# Patient Record
Sex: FEMALE | Race: OTHER | Employment: UNEMPLOYED | URBAN - METROPOLITAN AREA
[De-identification: names, ages, dates, MRNs, and addresses within clinical notes are randomized per-mention and may not be internally consistent; named-entity substitution may affect disease eponyms.]

---

## 2022-03-22 ENCOUNTER — OFFICE VISIT (OUTPATIENT)
Dept: ORTHOPEDIC SURGERY | Age: 22
End: 2022-03-22
Payer: COMMERCIAL

## 2022-03-22 VITALS
HEIGHT: 61 IN | OXYGEN SATURATION: 98 % | WEIGHT: 97.2 LBS | HEART RATE: 84 BPM | BODY MASS INDEX: 18.35 KG/M2 | TEMPERATURE: 98.5 F

## 2022-03-22 DIAGNOSIS — M54.9 UPPER BACK PAIN: ICD-10-CM

## 2022-03-22 DIAGNOSIS — S16.1XXA REPETITIVE STRAIN INJURY OF CERVICAL SPINE, INITIAL ENCOUNTER: Primary | ICD-10-CM
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
## 2022-03-22 DIAGNOSIS — M41.9 MILD SCOLIOSIS: ICD-10-CM

## 2022-03-22 PROCEDURE — 99203 OFFICE O/P NEW LOW 30 MIN: CPT | Performed by: PHYSICAL MEDICINE & REHABILITATION

## 2022-03-22 NOTE — PROGRESS NOTES
[address]  [address], [address]  [facility], [address]  Phone: [phone]  Fax: [phone]      Patient: Gi Farley                                                                              MRN: [id number]        YOB: 2000          AGE: 22 y.o. PCP: Vicenta Rosa NP  Date:  03/22/22    Reason for Consultation: Back Pain      HPI:  Gi Farley is a 22 y.o. female with relevant PMH of mild scoliosis who presents with upper back pain. The pain began several years ago but worsened after her recent pregnancy and delivery 2/2021. She was told several years ago she had scoliosis and did have recent x-rays which demonstrate a mild upper thoracic curvature in her spine and mild lumbar curvature . Neurologic symptoms: + numbness, tingling No  weakness, bowel or bladder changes. No recent falls      Location: The pain is located in the upper back b/l shoulders   Radiation: The pain does radiate not radiate. Pain Score: Currently: 10/10  Quality: Pain is of a Burning, Cramping, Stabbing, Stiff, Tingling and Numbness quality. Aggravating: Pain is exacerbated by sitting and standing, at night   Alleviating: The pain is alleviated by nothing    Prior Treatments:  Physical therapy: NO  Injections:NO    Previous Medications:   Current Medications:  Previous work-up has included:   X-ray spine 2/14/2022  mild to moderate right mid to upper thoracic scoliosis with very mild compensatory left mid to lower lumbar scoliosis. Scoliotic curvature is estimated to measure approximately 12.5 degrees as measured from superior endplate of T4 down through inferior endplate of T9. No osseous congenital abnormalities are otherwise identified. Past Medical History:   Past Medical History:   Diagnosis Date    Scoliosis       Past Surgical History: History reviewed. No pertinent surgical history.    SocHx:   Social History Tobacco Use    Smoking status: Never Smoker    Smokeless tobacco: Never Used   Substance Use Topics    Alcohol use: Not on file      FamHx:? No family history on file. Current Medications: Allergies:  No Known Allergies     Review of Systems:   Gen:    Denied fevers, chills, malaise, fatigue, weight changes   Resp: Denied shortness of breath, cough, wheezing   CVS: Denied chest pain, palpitations   : Denied urinary urgency, frequency, incontinence   GI: Denied nausea, vomiting, constipation, diarrhea   Skin: Denied rashes, wounds   Psych: Denied anxiety, depression   Vasc: Denied claudication, ulcers   Hem: Denied easy bruising/bleeding   MSK: See HPI   Neuro: See HPI         Physical Exam     Vital Signs:   Visit Vitals  Pulse 84   Temp 98.5 °F (36.9 °C) (Temporal)   Ht 5' 1\" (1.549 m)   Wt 97 lb 3.2 oz (44.1 kg)   LMP 03/02/2022   SpO2 98%   BMI 18.37 kg/m²      General: ??????? Well nourished and well developed female without any acute distress   Psychiatric: ?  Alert and oriented x 3 with normal mood    HEENT: ???????? Atraumatic   Respiratory:   Breathing non-labored and non dyspneic   CV: ???????????????? Peripheral pulses intact, no peripheral edema   Skin: ????????????? No rashes       Neurologic: ?? Sensation: normal and grossly intact thebilateral, upper extremity(s)    Strength: 5/5 in the bilateral, upper extremity(s)   Reflexes: reveals 2+ symmetric DTRs throughout    Gait: normal   Upper tract signs:Heredia's negative ?      Musculoskeletal: Cervical Exam /Thoracic  Alignment: Abnormal slight curvature right convex, right shoulder elevated  Atrophy: None     Tenderness to Palpation:   Cervical paraspinals: Positive R>L  Cervical spinous processes: Negative  Upper thoracic paraspinals: Positive  Upper thoracic spinous processes: Negative  Upper trapezius:  Positive right >left    Cervical ROM: Normal   Shoulder ROM: No reproduction of pain with movement     Special Tests Spurlings Maneuver: Negative           Medical Decision Making:    Images: The imaging results as well as the actual images of the studies below were reviewed, visualized and interpreted by me. Labs: The results below were reviewed. Reviewed images from [facility]- mild curvature thoracic spine right convex thoracic, left lower lumbar about 10 degrees       Assessment:   - cervical overuse  -mild scoliosis    Plan:      -Physical therapy -  Referral to PT for posture training  -Medications - NA- patient prefers to avoid. Counseled regarding side effects and appropriate administration of medications.    -Diagnostics/Imaging -reviewed imaging  -Injections -consider TPI vs Dry needling  -Lifestyle - Discussed ergonomics with lifting baby, etc  -Education - The patient's diagnosis, prognosis and treatment options were discussed today. All questions were answered.    F/U - in 8 week(s)         [address] [facility]

## 2022-03-22 NOTE — PROGRESS NOTES
Sonal Diana presents today for   Chief Complaint   Patient presents with    Back Pain       Is someone accompanying this pt? no    Is the patient using any DME equipment during OV? no    Depression Screening:  No flowsheet data found. Learning Assessment:  Learning Assessment 3/22/2022   PRIMARY LEARNER Patient   PRIMARY LANGUAGE Khmer   LEARNER PREFERENCE PRIMARY READING   ANSWERED BY patient   RELATIONSHIP SELF       Abuse Screening:  No flowsheet data found. Fall Risk  No flowsheet data found. OPIOID RISK TOOL  No flowsheet data found. Coordination of Care:  1. Have you been to the ER, urgent care clinic since your last visit? no  Hospitalized since your last visit? no    2. Have you seen or consulted any other health care providers outside of the [address] [place] [address] since your last visit? no Include any pap smears or colon screening.  no

## 2022-04-06 ENCOUNTER — HOSPITAL ENCOUNTER (OUTPATIENT)
Dept: PHYSICAL THERAPY | Age: 22
Discharge: HOME | End: 2022-04-06
Payer: COMMERCIAL

## 2022-04-06 PROCEDURE — 97162 PT EVAL MOD COMPLEX 30 MIN: CPT

## 2022-04-06 NOTE — PROGRESS NOTES
[address] [facility] AT [address], [address] [place] [address] Road  Phone: [phone]   [phone]  PLAN OF CARE / [address] [place] [address] SERVICES  Patient Name: Rebecca Mortensen : 2000   Medical   Diagnosis: Upper back pain [M54.9]  Repetitive strain injury of cervical spine [S16. 1XXA, X50. 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   ===========================================================================================  Assessment / key information: Pt is a 23 y/o F who presents to PT w/ c/o thoracic pain, that has been present for about 5 years. Pt reports sx worsened after giving birth in 8868 (uncomplicated pregnancy and vaginal delivery). Pt notes pain is constant, and ranges 8 to 10/10, made worse with static postures, laying flat supine; better with movement, lifting, laying reclined, edible cannabis. Describes pain as burning, sharp, aching. Testing/imaging has included x-rays, which showed scoliosis per pt report. Prior treatment has included nothing to date. Red flags negative. Denies paresthesias. FOTO 49/100, indicating 51% functional impairment. Clinical Exam Findings:  POSTURE/OBSERVATION: R>L GHJ IR, R>L medical scap border winging, thoracic dextroscoliosis. Abdominal coning present on TA isometric, 1 finger-width diastasis recti. ROM: c/s AROM flex 40p!, ext 55,R SB 35p!, L SB 20p!, RR 43, LR 55p! . T/s AROM RR 50%/LR 75% p!, ext 25% p!. Shoulder AROM ELY R T3p!/L T3p!, FIR R T6p!/L T4.  STRENGTH:  MMT RIGHT LEFT   Shoulder Flex 3+/5 p! 3+/5 p! Shoulder abd 3+/5 3+/5   Shoulder ER 3+/5 3+/5   Shoulder IR 4-/5 4-/5   Mid trap 3-/5 p! 3-/5p! Serratus ant 4-/5p! 4/5   Lower trap 2+/5 2+/5 p! PALPATION: TrP present to R>L thoracic paraspinals, rhomboids, mid trap, upper trap. Poor and painful t/s PA mobility T2-T8. SPECIAL TEST: (-) c/s compression, distraction, spurlings tests. C/s endurance test: poor, pt unable to clear occiput from table. Pt presents w/ sx suggesting/consistent with dorsalgia related to postural dysfunction. Pt could benefit from PT to address impairments and functional limitations in order to improve pt's ability to complete ADLs.  ===========================================================================================  Eval Complexity: History MEDIUM  Complexity : 1-2 comorbidities / personal factors will impact the outcome/ POC ;  Examination  MEDIUM Complexity : 3 Standardized tests and measures addressing body structure, function, activity limitation and / or participation in recreation ; Presentation MEDIUM Complexity : Evolving with changing characteristics ;   Decision Making MEDIUM Complexity : FOTO score of 26-74; Overall Complexity MEDIUM  Problem List: pain affecting function, decrease ROM, decrease strength, decrease ADL/ functional abilitiies, decrease activity tolerance and decrease flexibility/ joint mobility   Treatment Plan may include any combination of the following: Therapeutic exercise, Therapeutic activities, Neuromuscular re-education, Physical agent/modality, Manual therapy, Patient education, Self Care training, Functional mobility training and Home safety training  Patient / Family readiness to learn indicated by: asking questions, trying to perform skills and interest  Persons(s) to be included in education: patient (P)  Barriers to Learning/Limitations: None  Measures taken, if barriers to learning:    Patient Goal (s): \"not have pain anymore\"   Patient self reported health status: good  Rehabilitation Potential: good   Short Term Goals: To be accomplished in  2  weeks:  1. Establish and educate pt in HEP to supplement PT tx.  2. Pt will be I and compliant with HEP for self management of sx.  Long Term Goals: To be accomplished in  4  weeks:  1. Pt will demo good TA isometric contraction without abdominal coning to improve core stability  2. Improve B c/s rotation AROM to at least 70 deg to improve ability to check blindspot while driving  3. Improve mid trap and low trap MMT to at least 3/5 to improve postural stability for prolonged sitting  4. Improve FOTO score to >/= 62/100 to indicate improved function    Frequency / Duration:   Patient to be seen  2  times per week for 4  weeks: All LTG as above will be assessed and updated every 10 visits or 30 days and progressed as needed    Patient / Caregiver education and instruction: other posture  Therapist Signature: Carlitos White, PT Date: 7/9/5069   Certification Period: na Time: 12:33 PM   ===========================================================================================  I certify that the above Physical Therapy Services are being furnished while the patient is under my care. I agree with the treatment plan and certify that this therapy is necessary. Physician Signature:        Date:       Time:                         Dorothy Gallegos*  Please sign and return to [facility] or you may fax the signed copy to [phone]. Thank you. To ensure your patient receives the highest quality care and to avoid disruption in therapy please sign and return this plan of care within 21 days. Per Medicaid guidelines if the plan of care is not received within 21 days the patient's care must be put on hold until signed.

## 2022-04-06 NOTE — PROGRESS NOTES
PHYSICAL THERAPY - DAILY TREATMENT NOTE    Patient Name: Ced Wilson        Date: 2022  : 2000   yes Patient  Verified  Visit #:   1   of   8  Insurance: Payor: [address] / Plan: [address] / Product Type: Managed Care Medicaid /      In time: 4:33 Out time: 5:12   Total Treatment Time: 39     Medicare/BCBS Time Tracking (below)   Total Timed Codes (min):  na 1:1 Treatment Time:  na     TREATMENT AREA =  Neck pain [M54.2]    SUBJECTIVE  Pain Level (on 0 to 10 scale):  8  / 10   Medication Changes/New allergies or changes in medical history, any new surgeries or procedures?    no  If yes, update Summary List   Subjective Functional Status/Changes:  []  No changes reported     See POC          OBJECTIVE         Other Objective/Functional Measures:    See POC     Post Treatment Pain Level (on 0 to 10) scale:   7  / 10     ASSESSMENT  Assessment/Changes in Function:     See POC     []  See Progress Note/Recertification   Patient will continue to benefit from skilled PT services to modify and progress therapeutic interventions, address functional mobility deficits, address ROM deficits, address strength deficits, analyze and address soft tissue restrictions, analyze and cue movement patterns, analyze and modify body mechanics/ergonomics, assess and modify postural abnormalities and instruct in home and community integration to attain remaining goals.    Progress toward goals / Updated goals:    See POC     PLAN  []  Upgrade activities as tolerated yes Continue plan of care   []  Discharge due to :    []  Other:      Therapist: Homero Layne PT    Date: 2022 Time: 12:33 PM     Future Appointments   Date Time Provider Sarah Shini   2022  4:15 PM Jessy Valdez PT [facility]   2022  1:45 PM Lindsey Davila MD [facility]

## 2022-04-20 ENCOUNTER — HOSPITAL ENCOUNTER (OUTPATIENT)
Dept: PHYSICAL THERAPY | Age: 22
End: 2022-04-20
Payer: COMMERCIAL

## 2022-04-25 ENCOUNTER — HOSPITAL ENCOUNTER (OUTPATIENT)
Dept: PHYSICAL THERAPY | Age: 22
Discharge: HOME | End: 2022-04-25
Payer: COMMERCIAL

## 2022-04-25 PROCEDURE — 97530 THERAPEUTIC ACTIVITIES: CPT

## 2022-04-25 PROCEDURE — 97110 THERAPEUTIC EXERCISES: CPT

## 2022-04-25 PROCEDURE — 97112 NEUROMUSCULAR REEDUCATION: CPT

## 2022-04-25 NOTE — PROGRESS NOTES
PHYSICAL THERAPY - DAILY TREATMENT NOTE    Patient Name: Selma Ruffin        Date: 2022  : 2000   yes Patient  Verified  Visit #:   2   of   8  [facility]: [facility] [address] / Plan: [address] / Product Type: Managed Care Medicaid /      In time: 5:02 Out time: 5:59   Total Treatment Time: 57     Medicare/BCBS Time Tracking (below)   Total Timed Codes (min):  40 1:1 Treatment Time:       TREATMENT AREA =  Pain in thoracic spine [M54.6]    SUBJECTIVE  Pain Level (on 0 to 10 scale):  8-9  / 10   Medication Changes/New allergies or changes in medical history, any new surgeries or procedures?    no  If yes, update Summary List   Subjective Functional Status/Changes:  []  No changes reported     I feel most of the pain in the middle of my lower back and it gets worse if I sit or lay still, so I am constantly doing things or moving so that it doesn't hurt as much.           OBJECTIVE  Modality rationale: decrease pain and increase tissue extensibility to improve the patients ability to improve functional abilities    Min Type Additional Details    [] Estim: []Att   []Unatt  []TENS instruct                 []IFC  []Premod []NMES                       []Other:  []w/US   []w/ice   []w/heat  Position:  Location:    []  Traction: [] Cervical       []Lumbar                       [] Prone          []Supine                       []Intermittent   []Continuous Lbs:  [] before manual  [] after manual    []  Ultrasound: []Continuous   [] Pulsed                           []1MHz   []3MHz Location:  W/cm2:    []  Iontophoresis with dexamethasone         Location: [] Take home patch   [] In clinic   10 []  Ice     [x]  heat  []  Ice massage Position:prone  Location:scapulothoracic     []  Vasopneumatic Device Pressure: [] lo [] med [] hi   Temp: [] lo [] med [] hi   [x] Skin assessment post-treatment:  [x]intact [x]redness- no adverse reaction       []redness - adverse reaction: 15 min Therapeutic Exercise:     Rationale: increase ROM, increase strength, improve coordination, improve balance, and increase proprioception to improve the patients ability to progress to functional activities limited by pain or other dysfunction     13 min Therapeutic Activity:     Rationale: to improve functional activities, model real life movements and performance specific to the patients need with supervision to return the patient to their PLOF      12 min Neuromuscular Re-education:     Rationale: exercises designed to improve and/or maintain balance, coordination, kinesthetic sense, posture, and/or proprioception for functional activities to improve the patients ability to function in daily life    7 min Manual Therapy:  NC per insurance restrictions Grade 4 prone thoracic P>A mobs and STM/tissue mobs to bilateral medial scapulothoracic musculature    Rationale: decrease pain, increase ROM, increase tissue extensibility, decrease trigger points and increase postural awareness to improve functional mobility   The manual therapy interventions were performed at a separate and distinct time from the therapeutic activities interventions          Billed With/As:   [] TE   [] TA   [] Neuro   [] Self Care Patient Education: [x] Review HEP    [] Progressed/Changed HEP based on:   [] positioning   [] body mechanics   [] transfers   [] heat/ice application    [] other:      Other Objective/Functional Measures:  Verbal cueing for proper form/technique with various exercises during treatment today  Extra emphasis on diaphragmatic breathing and TA recruitment with avoiding coning     Post Treatment Pain Level (on 0 to 10) scale:   6  / 10     ASSESSMENT  Assessment/Changes in Function:   Pt tolerated all new therex fairly well upon trial with chief c/o mid thoracic/scapulothoracic pain/symptoms especially with prolonged static positioning, but better on the move.  Pt did present with increased palpable bilateral scapulothoracic tension/congestion/trigger points with manual intervention needs the most focus on scapulothoracic mobility and strengthening as well as postural awareness. Will continue to progress/advance patient within current POC as tolerated with monitoring symptoms. []  See Progress Note/Recertification   Patient will continue to benefit from skilled PT services to modify and progress therapeutic interventions, address functional mobility deficits, address ROM deficits, address strength deficits, analyze and address soft tissue restrictions, analyze and cue movement patterns, analyze and modify body mechanics/ergonomics, assess and modify postural abnormalities and instruct in home and community integration to attain remaining goals. Progress toward goals / Updated goals: · Short Term Goals: To be accomplished in  2  weeks:  1. Establish and educate pt in HEP to supplement PT tx. 4/25/22: Met. HEP established and issued today  2. Pt will be I and compliant with HEP for self management of sx. · Long Term Goals: To be accomplished in  4  weeks:  1. Pt will demo good TA isometric contraction without abdominal coning to improve core stability  2. Improve B c/s rotation AROM to at least 70 deg to improve ability to check blindspot while driving  3. Improve mid trap and low trap MMT to at least 3/5 to improve postural stability for prolonged sitting  4.  Improve FOTO score to >/= 62/100 to indicate improved function     PLAN  [x]  Upgrade activities as tolerated yes Continue plan of care   []  Discharge due to :    []  Other:      Therapist: Thao Sanchez PTA    Date: 4/25/2022 Time: 5:07 PM     Future Appointments   Date Time Provider Sarah Cabello   4/27/2022  5:15 PM Inis Felt [facility]   5/2/2022  4:00 PM Jarret Deluna PTA [facility]   5/4/2022  4:30 PM Inis Felt [facility]   5/9/2022  4:15 PM Darren Salguero, PT [facility]   5/11/2022  4:30 PM Jarret Deluna PTA [facility] 5/17/2022  1:45 PM Mindy Laura MD [facility]

## 2022-05-02 ENCOUNTER — HOSPITAL ENCOUNTER (OUTPATIENT)
Dept: PHYSICAL THERAPY | Age: 22
Discharge: HOME | End: 2022-05-02
Payer: COMMERCIAL

## 2022-05-02 PROCEDURE — 97530 THERAPEUTIC ACTIVITIES: CPT

## 2022-05-02 PROCEDURE — 97112 NEUROMUSCULAR REEDUCATION: CPT

## 2022-05-02 PROCEDURE — 97110 THERAPEUTIC EXERCISES: CPT

## 2022-05-02 PROCEDURE — 97014 ELECTRIC STIMULATION THERAPY: CPT

## 2022-05-02 NOTE — PROGRESS NOTES
PHYSICAL THERAPY - DAILY TREATMENT NOTE    Patient Name: Ced Wilson        Date: 2022  : 2000   yes Patient  Verified  Visit #:   3   of   8  [facility] [address] / Plan: [address] / Product Type: Managed Care Medicaid /      In time: 4:06 Out time: 5:19   Total Treatment Time: 73     Medicare/BCBS Time Tracking (below)   Total Timed Codes (min):  56 1:1 Treatment Time:       TREATMENT AREA =  Pain in thoracic spine [M54.6]    SUBJECTIVE  Pain Level (on 0 to 10 scale):  7  / 10   Medication Changes/New allergies or changes in medical history, any new surgeries or procedures?    no  If yes, update Summary List   Subjective Functional Status/Changes:  []  No changes reported     My back actually felt a little bit better for a while after I was here last time, I think that what you did with getting that pop out of my back and massaging it out helped.            OBJECTIVE  Modality rationale: decrease scapulothoracic muscle spasms to improve the patients ability to improve functional abilities    Min Type Additional Details   10 [] Estim: []Att   []Unatt  []TENS instruct                 []IFC  []Premod []NMES                       [x]Other:hi volt  []w/US   []w/ice   [x]w/heat  Position:prone   Location:scapulothoracic     []  Traction: [] Cervical       []Lumbar                       [] Prone          []Supine                       []Intermittent   []Continuous Lbs:  [] before manual  [] after manual    []  Ultrasound: []Continuous   [] Pulsed                           []1MHz   []3MHz Location:  W/cm2:    []  Iontophoresis with dexamethasone         Location: [] Take home patch   [] In clinic    []  Ice     []  heat  []  Ice massage Position:  Location:    []  Vasopneumatic Device Pressure: [] lo [] med [] hi   Temp: [] lo [] med [] hi   [x] Skin assessment post-treatment:  [x]intact [x]redness- no adverse reaction       []redness - adverse reaction: 19 min Therapeutic Exercise:     Rationale: increase ROM, increase strength, improve coordination, improve balance, and increase proprioception to improve the patients ability to progress to functional activities limited by pain or other dysfunction     18 min Therapeutic Activity:     Rationale: to improve functional activities, model real life movements and performance specific to the patients need with supervision to return the patient to their PLOF      19 min Neuromuscular Re-education:     Rationale: exercises designed to improve and/or maintain balance, coordination, kinesthetic sense, posture, and/or proprioception for functional activities to improve the patients ability to function in daily life    7 min Manual Therapy:  NC per insurance restrictions Grade 4 prone thoracic P>A mobs and STM/tissue mobs and Graston Therapy with GT-1 to bilateral medial scapulothoracic musculature    Rationale: decrease pain, increase ROM, increase tissue extensibility, decrease trigger points and increase postural awareness to improve functional abilities   The manual therapy interventions were performed at a separate and distinct time from the therapeutic activities interventions          Billed With/As:   [] TE   [] TA   [] Neuro   [] Self Care Patient Education: [x] Review HEP    [] Progressed/Changed HEP based on:   [] positioning   [] body mechanics   [] transfers   [] heat/ice application    [] other:      Other Objective/Functional Measures:  Verbal cueing for proper form/technique with various exercises during treatment today  Addition of cervicothoracic stabs with 2 lbs, cat/cow stretches, open book stretches and 3 way prayer stretches       Post Treatment Pain Level (on 0 to 10) scale:   5  / 10     ASSESSMENT  Assessment/Changes in Function:   Pt presented with increased short term relief of mid thoracic/scapulothoracic pain/symptoms for a few hours after last treatment until symptoms returned to the same level. Pt was also able to advance to addition of cervicothoracic stabs with 2 lbs, cat/cow stretches, open book stretches and 3 way prayer stretches with min to mod challenge today. Will review detailed progress/goals for physician update next treatment with continuing to progress/advance within current POC/protocol as tolerated. []  See Progress Note/Recertification   Patient will continue to benefit from skilled PT services to modify and progress therapeutic interventions, address functional mobility deficits, address ROM deficits, address strength deficits, analyze and address soft tissue restrictions, analyze and cue movement patterns, analyze and modify body mechanics/ergonomics, assess and modify postural abnormalities and instruct in home and community integration to attain remaining goals. Progress toward goals / Updated goals: · Short Term Goals: To be accomplished in  2  weeks:  1. Establish and educate pt in HEP to supplement PT tx. 4/25/22: Met. HEP established and issued today  2. Pt will be I and compliant with HEP for self management of sx. · Long Term Goals: To be accomplished in  4  weeks:  1. Pt will demo good TA isometric contraction without abdominal coning to improve core stability  2. Improve B c/s rotation AROM to at least 70 deg to improve ability to check blindspot while driving  3. Improve mid trap and low trap MMT to at least 3/5 to improve postural stability for prolonged sitting  4.  Improve FOTO score to >/= 62/100 to indicate improved function     PLAN  [x]  Upgrade activities as tolerated yes Continue plan of care   []  Discharge due to :    []  Other:      Therapist: Tamir Patricio PTA    Date: 5/2/2022 Time: 4:15 PM     Future Appointments   Date Time Provider Sarah Cabello   5/4/2022  4:30 PM Maria Del Carmen Must [facility]   5/9/2022  4:15 PM Mary Jane Roman, PT [facility]   5/11/2022  4:30 PM aJda Gates, PTA [facility]   5/17/2022  1:45 PM Pauline Bowser, MD [facility]

## 2022-05-04 ENCOUNTER — HOSPITAL ENCOUNTER (OUTPATIENT)
Dept: PHYSICAL THERAPY | Age: 22
Discharge: HOME | End: 2022-05-04
Payer: COMMERCIAL

## 2022-05-04 PROCEDURE — 97530 THERAPEUTIC ACTIVITIES: CPT

## 2022-05-04 PROCEDURE — 97112 NEUROMUSCULAR REEDUCATION: CPT

## 2022-05-04 PROCEDURE — 97110 THERAPEUTIC EXERCISES: CPT

## 2022-05-04 NOTE — PROGRESS NOTES
[address] [place] [address] PHYSICAL THERAPY  [facility], [place], [address] - Phone: [phone]  Fax: [phone]  PROGRESS NOTE  Patient Name: Nancy Gutiérrez : 2000   Treatment/Medical Diagnosis: Pain in thoracic spine [M54.6]   Referral Source: Rowdy Syed*     Date of Initial Visit: 22 Attended Visits: 4 Missed Visits: 1     SUMMARY OF TREATMENT  Therapeutic exercise for cervical/thoracic mobility/flexibility, postural awareness/strengthening, cervicothoracic/core stabilization, manual intervention, electrical stimulation with moist heat and HEP. CURRENT STATUS  Pt reports 25% overall improvement in sx since beginning care. Pt reports 9/10 max pain, 5/10 avg pain. Pain made worse with no particular increase/change in activity. Pt presented with chief c/o and findings of mid thoracic/scapulothoracic pain/tension which is palpable with manual intervention. Pt did report increased pain intensity in region after last treatment with no specific change/increase in activity, but reported decreased pain/symptoms down to 3/10 at end of treatment today. Pt is making slow but steady progress with gaining scapulothoracic mobility as well as advancing with strengthening and cervicothoracic stabilization within current POC. Improvements: Pt reports the most functional improvement with decreased frequency and intensity of pain/symptoms with normal level ADL's. Remaining functional limitations: Increased limitations/pain/symptoms intermittently with no particular increase/change in activity. Objective Measurements:  Cervical AROM= Flexion= 30 deg; Extension= 50 deg; Side bending= 31 deg bilaterally; Rotation= R= 84 deg, L= 74 deg  Thoracic AROM= (measured in standing) Rotation= R= 50%, L= 40%  Parascapular strength= Mid Traps= 3+/5 bilaterally; Lower Traps= 3+/5 bilaterally       Goal/Measure of Progress Goal Met?    1.    Establish and educate pt in HEP to supplement PT tx. Status at last Eval: To be established and issued at second visit Current Status: Established and issued at second visit yes   2. Pt will be I and compliant with HEP for self management of sx. Status at last Eval: Established and issued at second visit Current Status: Pt reports independence and compliance with current HEP approximately 1x/day yes   3. Pt will demo good TA isometric contraction without abdominal coning to improve core stability   Status at last Eval:  Abdominal coning present on TA isometric, 1 finger-width diastasis recti. Current Status: Good, improved palpable TA isometric  yes     Goal/Measure of Progress Goal Met? 1. Improve B c/s rotation AROM to at least 70 deg to improve ability to check blindspot while driving   Status at last Eval: RR 43, LR 55p! .  Current Status: RR 84, LR 74 yes   2. Improve mid trap and low trap MMT to at least 3/5 to improve postural stability for prolonged sitting   Status at last Eval: Mid Traps= 3-/5 p! Bilaterally; Lower Traps= 2+/5 p! Bilaterally  Current Status: Mid Traps= 3+/5  Bilaterally; Lower Traps= 3-/5  Bilaterally w/ UT comp Met for mid trap, progress low trap   3. Improve FOTO score to >/= 62/100 to indicate improved function   Status at last Eval: 49/100 Current Status: Not taken n/a     New Goals to be achieved in __6-8__  treatments: 1. Improve FOTO score to >/= 62/100 to indicate improved function   2. Pt will report =/> 75% overall improvement from therapy since initial evaluation to indicate increased functional improvement from current POC. 3. Pt will report =/< 5/10 max pain level for increased function with ADL's  4. Pt will report =/> 75% reduction in scapulothoracic tension with normal level ADL's with manual intervention concentrated to region. 5. Pt will demonstrate =/> 25% thoracic rotation mobility bilaterally for increased functional mobility with ADL's.     RECOMMENDATIONS  Continue with current POC for 6-8 additional visits with advancing as tolerated, then reassess for the need for continuation or discharge from therapy. If you have any questions/comments please contact us directly at [phone]. Thank you for allowing us to assist in the care of your patient. Therapist Signature: Marcelina Amin PTA Date: 5/4/2022    Veronica Ryley PT, DPT, CMTPT Time: 1:21 PM   NOTE TO PHYSICIAN:  PLEASE COMPLETE THE ORDERS BELOW AND FAX TO   [facility] Physical Therapy: [phone]  If you are unable to process this request in 24 hours please contact our office: [phone]    ___ I have read the above report and request that my patient continue as recommended.   ___ I have read the above report and request that my patient continue therapy with the following changes/special instructions:_________________________________________________________   ___ I have read the above report and request that my patient be discharged from therapy.      Physician Signature:        Date:       Time:       Dorothy Gallegos

## 2022-05-04 NOTE — PROGRESS NOTES
PHYSICAL THERAPY - DAILY TREATMENT NOTE    Patient Name: Miguel Bucio        Date: 2022  : 2000   no Patient  Verified  Visit #:   4   of   8  Insurance: Payor: [address] [place] [address] Road / Plan: [address] / Product Type: Managed Care Medicaid /      In time: 4:30 Out time: 5:28   Total Treatment Time: 58     Medicare/BCBS Time Tracking (below)   Total Timed Codes (min):  48 1:1 Treatment Time:       TREATMENT AREA =  Pain in thoracic spine [M54.6]    SUBJECTIVE  Pain Level (on 0 to 10 scale):  9  / 10   Medication Changes/New allergies or changes in medical history, any new surgeries or procedures?    no  If yes, update Summary List   Subjective Functional Status/Changes:  []  No changes reported     My back has been feeling pretty rough since I was here on Monday, I don't know if it was that electric treatment or what, but I don't remember anything specifically causing me to have more pain.           OBJECTIVE  Modality rationale: decrease pain and increase tissue extensibility to improve the patients ability to improve functional abilities    Min Type Additional Details    [] Estim: []Att   []Unatt  []TENS instruct                 []IFC  []Premod []NMES                       []Other:  []w/US   []w/ice   []w/heat  Position:  Location:    []  Traction: [] Cervical       []Lumbar                       [] Prone          []Supine                       []Intermittent   []Continuous Lbs:  [] before manual  [] after manual    []  Ultrasound: []Continuous   [] Pulsed                           []1MHz   []3MHz Location:  W/cm2:    []  Iontophoresis with dexamethasone         Location: [] Take home patch   [] In clinic   10 []  Ice     [x]  heat  []  Ice massage Position:supine  Location:scapulothoracic     []  Vasopneumatic Device Pressure: [] lo [] med [] hi   Temp: [] lo [] med [] hi   [x] Skin assessment post-treatment:  [x]intact [x]redness- no adverse reaction       []redness - adverse reaction:       16 min Therapeutic Exercise:     Rationale: increase ROM, increase strength, improve coordination, improve balance, and increase proprioception to improve the patients ability to progress to functional activities limited by pain or other dysfunction     16 min Therapeutic Activity:     Rationale: to improve functional activities, model real life movements and performance specific to the patients need with supervision to return the patient to their PLOF      16 min Neuromuscular Re-education:     Rationale: exercises designed to improve and/or maintain balance, coordination, kinesthetic sense, posture, and/or proprioception for functional activities to improve the patients ability to function in daily life          Billed With/As:   [] TE   [] TA   [] Neuro   [] Self Care Patient Education: [x] Review HEP    [] Progressed/Changed HEP based on:   [] positioning   [] body mechanics   [] transfers   [] heat/ice application    [] other:      Other Objective/Functional Measures:       Post Treatment Pain Level (on 0 to 10) scale:   3  / 10     ASSESSMENT  Assessment/Changes in Function:   See Progress note/Physician update for full detailed progress towards established goals. [x]  See Progress Note/Recertification   Patient will continue to benefit from skilled PT services to modify and progress therapeutic interventions, address functional mobility deficits, address ROM deficits, address strength deficits, analyze and address soft tissue restrictions, analyze and cue movement patterns, analyze and modify body mechanics/ergonomics, assess and modify postural abnormalities and instruct in home and community integration to attain remaining goals. Progress toward goals / Updated goals:  See Progress note/Physician update for full detailed progress towards established goals.      PLAN  [x]  Upgrade activities as tolerated yes Continue plan of care   []  Discharge due to :    []  Other: Therapist: Angelita Robison, PTA    Date: 5/4/2022 Time: 1:18 PM     Future Appointments   Date Time Provider Sarah Shini   5/4/2022  4:30 PM Dav Polanco [facility]   5/9/2022  4:15 PM Nisha Post, PT [facility]   5/11/2022  4:30 PM Harrison Hunt, PTA [facility]   5/17/2022  1:45 PM Pepito Barth MD [facility]

## 2022-05-09 ENCOUNTER — HOSPITAL ENCOUNTER (OUTPATIENT)
Dept: PHYSICAL THERAPY | Age: 22
Discharge: HOME | End: 2022-05-09
Payer: COMMERCIAL

## 2022-05-09 PROCEDURE — 97112 NEUROMUSCULAR REEDUCATION: CPT

## 2022-05-09 PROCEDURE — 97530 THERAPEUTIC ACTIVITIES: CPT

## 2022-05-09 PROCEDURE — 97110 THERAPEUTIC EXERCISES: CPT

## 2022-05-09 NOTE — PROGRESS NOTES
PHYSICAL THERAPY - DAILY TREATMENT NOTE    Patient Name: Jason Seth        Date: 2022  : 2000   no Patient  Verified  Visit #:   5   of   8  Insurance: Payor: [address] [place] [address] Road / Plan: [address] / Product Type: Managed Care Medicaid /      In time: 4:21 Out time: 5:02   Total Treatment Time: 41     Medicare/BCBS Time Tracking (below)   Total Timed Codes (min): n/a 1:1 Treatment Time:  n/a     TREATMENT AREA =  Pain in thoracic spine [M54.6]    SUBJECTIVE  Pain Level (on 0 to 10 scale):  0  / 10   Medication Changes/New allergies or changes in medical history, any new surgeries or procedures?    no  If yes, update Summary List   Subjective Functional Status/Changes:  []  No changes reported     \"I don't have any pain right now but the last few days were horrible\". Pt reports trying HEP but notes nothing helped relieve her sx.       OBJECTIVE  Modality rationale: decrease pain and increase tissue extensibility to improve the patients ability to improve functional abilities    Min Type Additional Details    [] Estim: []Att   []Unatt  []TENS instruct                 []IFC  []Premod []NMES                       []Other:  []w/US   []w/ice   []w/heat  Position:  Location:    []  Traction: [] Cervical       []Lumbar                       [] Prone          []Supine                       []Intermittent   []Continuous Lbs:  [] before manual  [] after manual    []  Ultrasound: []Continuous   [] Pulsed                           []1MHz   []3MHz Location:  W/cm2:    []  Iontophoresis with dexamethasone         Location: [] Take home patch   [] In clinic   10 []  Ice     [x]  heat  []  Ice massage Position:supine  Location:scapulothoracic     []  Vasopneumatic Device Pressure: [] lo [] med [] hi   Temp: [] lo [] med [] hi   [x] Skin assessment post-treatment:  [x]intact [x]redness- no adverse reaction       []redness - adverse reaction:       8 min Therapeutic Exercise: Rationale: increase ROM, increase strength, improve coordination, improve balance, and increase proprioception to improve the patients ability to progress to functional activities limited by pain or other dysfunction     8 min Therapeutic Activity:     Rationale: to improve functional activities, model real life movements and performance specific to the patients need with supervision to return the patient to their PLOF      8 min Neuromuscular Re-education:     Rationale: exercises designed to improve and/or maintain balance, coordination, kinesthetic sense, posture, and/or proprioception for functional activities to improve the patients ability to function in daily life      7 min Manual Therapy:  NC per insurance restrictions STM to B thoracic paraspinals. Rhomboids, upper/middle trap;    Rationale: decrease pain, increase ROM, increase tissue extensibility, decrease trigger points and increase postural awareness to improve functional abilities   The manual therapy interventions were performed at a separate and distinct time from the therapeutic activities interventions            Billed With/As:   [x] TE   [] TA   [] Neuro   [] Self Care Patient Education: [x] Review HEP    [] Progressed/Changed HEP based on:   [] positioning   [] body mechanics   [] transfers   [] heat/ice application    [] other:      Other Objective/Functional Measures:    -cues for c/s retraction with standing CT stabs at wall  -minimal coning present with TA draw noted  -added 2\" weight to diaphragmatic breathing; initial cueing to avoid chest expansion  -added thoracic ext/rot with ELY to thread the needle to improve t/s mobility  -req cueing for exercise tempo and    Post Treatment Pain Level (on 0 to 10) scale:   5  / 10     ASSESSMENT  Assessment/Changes in Function:     Plan to progress TA draw to TA marching at [place] as pt has demonstrated ability to engage TA without coning.  Plan to also progress supine chin tuck to supine deep c/s flexion. [x]  See Progress Note/Recertification   Patient will continue to benefit from skilled PT services to modify and progress therapeutic interventions, address functional mobility deficits, address ROM deficits, address strength deficits, analyze and address soft tissue restrictions, analyze and cue movement patterns, analyze and modify body mechanics/ergonomics, assess and modify postural abnormalities and instruct in home and community integration to attain remaining goals. Progress toward goals / Updated goals:    1. Improve FOTO score to >/= 62/100 to indicate improved function   2. Pt will report =/> 75% overall improvement from therapy since initial evaluation to indicate increased functional improvement from current POC. 3. Pt will report =/< 5/10 max pain level for increased function with ADL's  4. Pt will report =/> 75% reduction in scapulothoracic tension with normal level ADL's with manual intervention concentrated to region.   5. Pt will demonstrate =/> 25% thoracic rotation mobility bilaterally for increased functional mobility with ADL's. 5/9/22: added t/s rot/ext with ELY to thread the needle to address     PLAN  [x]  Upgrade activities as tolerated yes Continue plan of care   []  Discharge due to :    []  Other:      Therapist: Karrie Bullard PT    Date: 5/9/2022 Time: 1:18 PM     Future Appointments   Date Time Provider Sarah Cabello   5/9/2022  4:15 PM Zak Arnold, PT [facility]   5/11/2022  4:30 PM Dominic López PTA [facility]   5/17/2022  1:45 PM Alexis Orellana MD [facility]

## 2022-05-11 ENCOUNTER — HOSPITAL ENCOUNTER (OUTPATIENT)
Dept: PHYSICAL THERAPY | Age: 22
Discharge: HOME | End: 2022-05-11
Payer: COMMERCIAL

## 2022-05-11 PROCEDURE — 97110 THERAPEUTIC EXERCISES: CPT

## 2022-05-11 PROCEDURE — 97112 NEUROMUSCULAR REEDUCATION: CPT

## 2022-05-11 PROCEDURE — 97530 THERAPEUTIC ACTIVITIES: CPT

## 2022-05-11 NOTE — PROGRESS NOTES
PHYSICAL THERAPY - DAILY TREATMENT NOTE    Patient Name: Nya Evans        Date: 2022  : 2000   yes Patient  Verified  Visit #:   6  of   8  Ins[facility]: [facility] [address] / Plan: [address] / Product Type: Managed Care Medicaid /      In time: 4:33 Out time: 5:30   Total Treatment Time: 57     Medicare/BCBS Time Tracking (below)   Total Timed Codes (min):  40 1:1 Treatment Time:       TREATMENT AREA =  Pain in thoracic spine [M54.6]    SUBJECTIVE  Pain Level (on 0 to 10 scale):  5  / 10   Medication Changes/New allergies or changes in medical history, any new surgeries or procedures? yes  If yes, update Summary List   Subjective Functional Status/Changes:  []  No changes reported     My back feels pretty good as long as I am up and about moving around, but it hurts the most when I am still not doing anything, especially at night.            OBJECTIVE  Modality rationale: decrease pain and increase tissue extensibility to improve the patients ability to improve functional abilities    Min Type Additional Details    [] Estim: []Att   []Unatt  []TENS instruct                 []IFC  []Premod []NMES                       []Other:  []w/US   []w/ice   []w/heat  Position:  Location:    []  Traction: [] Cervical       []Lumbar                       [] Prone          []Supine                       []Intermittent   []Continuous Lbs:  [] before manual  [] after manual    []  Ultrasound: []Continuous   [] Pulsed                           []1MHz   []3MHz Location:  W/cm2:    []  Iontophoresis with dexamethasone         Location: [] Take home patch   [] In clinic   10 []  Ice     [x]  heat  []  Ice massage Position:  Location:scapulothoracic     []  Vasopneumatic Device Pressure: [] lo [] med [] hi   Temp: [] lo [] med [] hi   [] Skin assessment post-treatment:  []intact []redness- no adverse reaction       []redness - adverse reaction:       14 min Therapeutic Exercise: Rationale: increase ROM, increase strength, improve coordination, improve balance, and increase proprioception to improve the patients ability to progress to functional activities limited by pain or other dysfunction     13 min Therapeutic Activity:     Rationale: to improve functional activities, model real life movements and performance specific to the patients need with supervision to return the patient to their PLOF      13 min Neuromuscular Re-education:     Rationale: exercises designed to improve and/or maintain balance, coordination, kinesthetic sense, posture, and/or proprioception for functional activities to improve the patients ability to function in daily life    7 min Manual Therapy:  NC per insurance restrictions  Grade 4 prone thoracic P>A mobs and STM/tissue mobs to bilateral medial scapulothoracic musculature    Rationale: decrease pain, increase ROM, increase tissue extensibility, decrease trigger points and increase postural awareness to improve functional mobility   The manual therapy interventions were performed at a separate and distinct time from the therapeutic activities interventions          Billed With/As:   [] TE   [] TA   [] Neuro   [] Self Care Patient Education: [x] Review HEP    [] Progressed/Changed HEP based on:   [] positioning   [] body mechanics   [] transfers   [] heat/ice application    [] other:      Other Objective/Functional Measures:  Verbal cueing for proper form/technique with various exercises during treatment today  Advanced from supine chin tucks to chin tucks with lift hold as well as addition of supine march stabs    Post Treatment Pain Level (on 0 to 10) scale:   0  / 10     ASSESSMENT  Assessment/Changes in Function:   Pt presented with chief c/o increased lumbothoracic pain/symptoms with inactivity/static positioning especially while sleeping, but better while on the move.  However, Pt was able to advance from supine chin tucks to chin tucks with lift hold as well as addition of supine march stabs with min to mod challenge today. Will continue to progress/advance patient within current POC as tolerated with monitoring symptoms. []  See Progress Note/Recertification   Patient will continue to benefit from skilled PT services to modify and progress therapeutic interventions, address functional mobility deficits, address ROM deficits, address strength deficits, analyze and address soft tissue restrictions, analyze and cue movement patterns, analyze and modify body mechanics/ergonomics, assess and modify postural abnormalities and instruct in home and community integration to attain remaining goals. Progress toward goals / Updated goals:  1. Improve FOTO score to >/= 62/100 to indicate improved function   2. Pt will report =/> 75% overall improvement from therapy since initial evaluation to indicate increased functional improvement from current POC.   3. Pt will report =/< 5/10 max pain level for increased function with ADL's  4. Pt will report =/> 75% reduction in scapulothoracic tension with normal level ADL's with manual intervention concentrated to region.   5. Pt will demonstrate =/> 25% thoracic rotation mobility bilaterally for increased functional mobility with ADL's. 5/9/22: added t/s rot/ext with ELY to thread the needle to address     PLAN  [x]  Upgrade activities as tolerated yes Continue plan of care   []  Discharge due to :    []  Other:      Therapist: Abhilash Hernandez PTA    Date: 5/11/2022 Time: 1:31 PM     Future Appointments   Date Time Provider Sarah Cabello   5/11/2022  4:30 PM Ignacio Govea [facility]   5/17/2022  1:45 PM Fabio Benjamin MD [facility]

## 2022-05-18 ENCOUNTER — OFFICE VISIT (OUTPATIENT)
Dept: ORTHOPEDIC SURGERY | Age: 22
End: 2022-05-18
Payer: COMMERCIAL

## 2022-05-18 VITALS
DIASTOLIC BLOOD PRESSURE: 73 MMHG | RESPIRATION RATE: 15 BRPM | TEMPERATURE: 98.3 F | BODY MASS INDEX: 17.86 KG/M2 | SYSTOLIC BLOOD PRESSURE: 119 MMHG | OXYGEN SATURATION: 99 % | WEIGHT: 94.6 LBS | HEART RATE: 78 BPM | HEIGHT: 61 IN

## 2022-05-18 DIAGNOSIS — M41.9 MILD SCOLIOSIS: ICD-10-CM
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
## 2022-05-18 DIAGNOSIS — S16.1XXA REPETITIVE STRAIN INJURY OF CERVICAL SPINE, INITIAL ENCOUNTER: Primary | ICD-10-CM

## 2022-05-18 DIAGNOSIS — M54.9 UPPER BACK PAIN: ICD-10-CM

## 2022-05-18 PROCEDURE — 99212 OFFICE O/P EST SF 10 MIN: CPT | Performed by: PHYSICAL MEDICINE & REHABILITATION

## 2022-05-18 NOTE — PROGRESS NOTES
[address]  [address], [address]  [facility], [address]  Phone: [phone]  Fax: [phone]      Patient: Rebecca Mortensen                                                                              MRN: [id number]        YOB: 2000          AGE: 22 y.o. PCP: Altaf Galicia NP  Date:  05/18/22    Reason for Consultation: Neck Pain and Back Pain      HPI:  Rebecca Mortensen is a 22 y.o. female with relevant PMH of mild scoliosis who presents with upper back pain. The pain began several years ago but worsened after her recent pregnancy and delivery 2/2021. She was told several years ago she had scoliosis and did have recent x-rays which demonstrate a mild upper thoracic curvature in her spine and mild lumbar curvature . Since her last visit she has been doing PT. She denies any improvement in symptoms. Neurologic symptoms: + numbness, tingling No  weakness, bowel or bladder changes. No recent falls      Location: The pain is located in the upper back b/l shoulders   Radiation: The pain does radiate not radiate. Pain Score: Currently: 10/10  Quality: Pain is of a Burning, Cramping, Stabbing, Stiff, Tingling and Numbness quality. Aggravating: Pain is exacerbated by sitting and standing, at night   Alleviating: The pain is alleviated by nothing    Prior Treatments:  Physical therapy: Meño currently in PT   Injections:NO    Previous Medications:   Current Medications:  Previous work-up has included:   X-ray spine 2/14/2022  mild to moderate right mid to upper thoracic scoliosis with very mild compensatory left mid to lower lumbar scoliosis. Scoliotic curvature is estimated to measure approximately 12.5 degrees as measured from superior endplate of T4 down through inferior endplate of T9. No osseous congenital abnormalities are otherwise identified.     Past Medical History:   Past Medical History:   Diagnosis Date    Scoliosis       Past Surgical History: History reviewed. No pertinent surgical history. SocHx:   Social History     Tobacco Use    Smoking status: Never Smoker    Smokeless tobacco: Never Used   Substance Use Topics    Alcohol use: Not on file      FamHx:? History reviewed. No pertinent family history. Current Medications: Allergies: Allergies   Allergen Reactions    Latex Itching        Review of Systems:   Gen:    Denied fevers, chills, malaise, fatigue, weight changes   Resp: Denied shortness of breath, cough, wheezing   CVS: Denied chest pain, palpitations   : Denied urinary urgency, frequency, incontinence   GI: Denied nausea, vomiting, constipation, diarrhea   Skin: Denied rashes, wounds   Psych: Denied anxiety, depression   Vasc: Denied claudication, ulcers   Hem: Denied easy bruising/bleeding   MSK: See HPI   Neuro: See HPI         Physical Exam     Vital Signs:   Visit Vitals  /73 (BP 1 Location: Right arm, BP Patient Position: Sitting)   Pulse 78   Temp 98.3 °F (36.8 °C) (Temporal)   Resp 15   Ht 5' 1\" (1.549 m)   Wt 94 lb 9.6 oz (42.9 kg)   SpO2 99%   BMI 17.87 kg/m²      General: ??????? Well nourished and well developed female without any acute distress   Psychiatric: ?  Alert and oriented x 3 with normal mood    HEENT: ???????? Atraumatic   Respiratory:   Breathing non-labored and non dyspneic   CV: ???????????????? Peripheral pulses intact, no peripheral edema   Skin: ????????????? No rashes       Neurologic: ?? Sensation: normal and grossly intact thebilateral, upper extremity(s)    Strength: 5/5 in the bilateral, upper extremity(s)   Reflexes: reveals 2+ symmetric DTRs throughout    Gait: normal   Upper tract signs:Heredia's negative ?      Musculoskeletal: Cervical Exam /Thoracic  Alignment: Abnormal slight curvature right convex, right shoulder elevated  Atrophy: None     Tenderness to Palpation:   Cervical paraspinals: Positive R>L  Cervical spinous processes: Negative  Upper thoracic paraspinals: Positive  Upper thoracic spinous processes: Negative  Upper trapezius:  Positive right >left    Cervical ROM: Normal   Shoulder ROM: No reproduction of pain with movement     Special Tests    Spurlings Maneuver: Negative     Medical Decision Making:    Images: The imaging results as well as the actual images of the studies below were reviewed, visualized and interpreted by me. Labs: The results below were reviewed. Assessment:   - cervical overuse  -mild scoliosis    Plan:      -Physical therapy -  Continue PT for posture training  -Referral to also try chiropractic treatments   -Medications - NA- patient prefers to avoid. Counseled regarding side effects and appropriate administration of medications.    -Diagnostics/Imaging -reviewed imaging  -Injections -consider TPI vs Dry needling  -Lifestyle - Discussed ergonomics with lifting baby, etc  -Education - The patient's diagnosis, prognosis and treatment options were discussed today. All questions were answered.    F/U -prn        [address] [facility]

## 2022-05-19 ENCOUNTER — HOSPITAL ENCOUNTER (OUTPATIENT)
Dept: PHYSICAL THERAPY | Age: 22
Discharge: HOME | End: 2022-05-19
Payer: COMMERCIAL

## 2022-05-19 PROCEDURE — 97530 THERAPEUTIC ACTIVITIES: CPT

## 2022-05-19 PROCEDURE — 97112 NEUROMUSCULAR REEDUCATION: CPT

## 2022-05-19 PROCEDURE — 97110 THERAPEUTIC EXERCISES: CPT

## 2022-05-19 NOTE — PROGRESS NOTES
PHYSICAL THERAPY - DAILY TREATMENT NOTE    Patient Name: Cristi Orozco        Date: 2022  : 2000   yes Patient  Verified  Visit #:   7  of   10-12  Insurance: Payor: [facility] [address] / Plan: [address] / Product Type: Managed Care Medicaid /      In time: 5:32 PM Out time: 6:42   Total Treatment Time: 70     Medicare/BCBS Time Tracking (below)   Total Timed Codes (min):  60 1:1 Treatment Time:       TREATMENT AREA =  Pain in thoracic spine [M54.6]    SUBJECTIVE  Pain Level (on 0 to 10 scale):  5  / 10   Medication Changes/New allergies or changes in medical history, any new surgeries or procedures? yes  If yes, update Summary List   Subjective Functional Status/Changes:  []  No changes reported     Pt indicates area of pain today about upper t/s and UT region. \"I feel like I'm getting a little stronger\". Pt states she pain is relaxed for ~30 min after doing her HEP and then the pain comes back. The pain continues to reach a 10/10 especially with household cleaning.        OBJECTIVE  Modality rationale: decrease pain and increase tissue extensibility to improve the patients ability to improve functional abilities    Min Type Additional Details    [] Estim: []Att   []Unatt  []TENS instruct                 []IFC  []Premod []NMES                       []Other:  []w/US   []w/ice   []w/heat  Position:  Location:    []  Traction: [] Cervical       []Lumbar                       [] Prone          []Supine                       []Intermittent   []Continuous Lbs:  [] before manual  [] after manual    []  Ultrasound: []Continuous   [] Pulsed                           []1MHz   []3MHz Location:  W/cm2:    []  Iontophoresis with dexamethasone         Location: [] Take home patch   [] In clinic   10 []  Ice     [x]  heat  []  Ice massage Position: supine with LE wedge  Location:scapulothoracic     []  Vasopneumatic Device Pressure: [] lo [] med [] hi   Temp: [] lo [] med [] hi [x] Skin assessment post-treatment:  [x]intact []redness- no adverse reaction       []redness - adverse reaction:       20 min Therapeutic Exercise:     Rationale: increase ROM, increase strength, improve coordination, improve balance, and increase proprioception to improve the patients ability to progress to functional activities limited by pain or other dysfunction     18 min Therapeutic Activity:     Rationale: to improve functional activities, model real life movements and performance specific to the patients need with supervision to return the patient to their PLOF      15 min Neuromuscular Re-education:     Rationale: exercises designed to improve and/or maintain balance, coordination, kinesthetic sense, posture, and/or proprioception for functional activities to improve the patients ability to function in daily life    7 min Manual Therapy:  STM R>L levator scap, low trap, rhomboids. Strain counterstrain R lev scap. Liddie Casa \"I\" strip to R lev scap and \"I\" strip acromion to superior angle scap along low trap for scap support out of anterior tip, downward ROT. Rationale: decrease pain, increase ROM, increase tissue extensibility, decrease trigger points and increase postural awareness to improve functional mobility   The manual therapy interventions were performed at a separate and distinct time from the therapeutic activities interventions          Billed With/As:   [] TE   [] TA   [] Neuro   [] Self Care Patient Education: [x] Review HEP    [] Progressed/Changed HEP based on:   [] positioning   [] body mechanics   [] transfers   [] heat/ice application    [x] other: pt ed on indications/precaustions with Ktape. Ed on safe removal technique in 3-5 days or sooner if adverse sx's.      Other Objective/Functional Measures:  -UBE for periscap activation  -added D2 band flexion, doorway pec stretch  -regressed chin tuck/lift to seated c/s tuck with band resisted iso hold     Post Treatment Pain Level (on 0 to 10) scale:   0  / 10     ASSESSMENT  Assessment/Changes in Function:   Pt with heavy SCM compensations noted with attempt at deep c/s neck flexion; regressed to seated band resistance for improved activation of deep c/s neck flexors without SCM substitution. Pt with poor l/s control requiring manual cues to avoid lordosis with doorway stretch. Pt with significant hypertonicity to R levator and low trap, ant tipped scap. Pt ed on neutral spinal alignment and postural corrections throughout the day. Pt pain related to weakened core stability leading to R levator/UT compensations and spasms. Next visit, assess response to Ktape, consider progressing cat/cow to QP multifidus, consider advancing serratus strengthening (either by moving towards QP or adding resistance band), continue to provide cues during standing exercises to ensure optimal spinal alignment. []  See Progress Note/Recertification   Patient will continue to benefit from skilled PT services to modify and progress therapeutic interventions, address functional mobility deficits, address ROM deficits, address strength deficits, analyze and address soft tissue restrictions, analyze and cue movement patterns, analyze and modify body mechanics/ergonomics, assess and modify postural abnormalities and instruct in home and community integration to attain remaining goals. Progress toward goals / Updated goals:  1. Improve FOTO score to >/= 62/100 to indicate improved function   2. Pt will report =/> 75% overall improvement from therapy since initial evaluation to indicate increased functional improvement from current POC.   3. Pt will report =/< 5/10 max pain level for increased function with ADL's.-5/19: goal not met; max pain 10/10  4. Pt will report =/> 75% reduction in scapulothoracic tension with normal level ADL's with manual intervention concentrated to region.   5. Pt will demonstrate =/> 25% thoracic rotation mobility bilaterally for increased functional mobility with ADL's. 5/9/22: added t/s rot/ext with ELY to thread the needle to address     PLAN  [x]  Upgrade activities as tolerated yes Continue plan of care   []  Discharge due to :    []  Other:      Therapist: Karin Esteban.  Alvarado Perez, PT    Date: 5/19/2022 Time: 6:48 PM      Future Appointments   Date Time Provider Sarah Cabello   5/19/2022  5:30 PM Karan Briggs, PT [facility]   5/23/2022  4:45 PM Justyn Beltrán PTA [facility]   6/1/2022  4:30 PM Justyn Beltrán PTA [facility]

## 2022-05-23 ENCOUNTER — HOSPITAL ENCOUNTER (OUTPATIENT)
Dept: PHYSICAL THERAPY | Age: 22
Discharge: HOME | End: 2022-05-23
Payer: COMMERCIAL

## 2022-05-23 PROCEDURE — 97530 THERAPEUTIC ACTIVITIES: CPT

## 2022-05-23 PROCEDURE — 97110 THERAPEUTIC EXERCISES: CPT

## 2022-05-23 PROCEDURE — 97112 NEUROMUSCULAR REEDUCATION: CPT

## 2022-05-23 NOTE — PROGRESS NOTES
PHYSICAL THERAPY - DAILY TREATMENT NOTE    Patient Name: Omar Jimenez        Date: 2022  : 2000   yes Patient  Verified  Visit #:   8   of   10-12  Insurance: [facility] [address] / Plan: [address] / Product Type: Managed Care Medicaid /      In time: 4:45 Out time: 5:57   Total Treatment Time: 62     Medicare/BCBS Time Tracking (below)   Total Timed Codes (min):  45 1:1 Treatment Time:       TREATMENT AREA =  Pain in thoracic spine [M54.6]    SUBJECTIVE  Pain Level (on 0 to 10 scale):  4  / 10   Medication Changes/New allergies or changes in medical history, any new surgeries or procedures?    no  If yes, update Summary List   Subjective Functional Status/Changes:  []  No changes reported     My neck and back has been feeling a lot better after Autumn worked on me and did the taping on my neck and shoulder the last time I was here.            OBJECTIVE  Modality rationale: decrease pain and increase tissue extensibility to improve the patients ability to improve functional abilities    Min Type Additional Details    [] Estim: []Att   []Unatt  []TENS instruct                 []IFC  []Premod []NMES                       []Other:  []w/US   []w/ice   []w/heat  Position:  Location:    []  Traction: [] Cervical       []Lumbar                       [] Prone          []Supine                       []Intermittent   []Continuous Lbs:  [] before manual  [] after manual    []  Ultrasound: []Continuous   [] Pulsed                           []1MHz   []3MHz Location:  W/cm2:    []  Iontophoresis with dexamethasone         Location: [] Take home patch   [] In clinic   10 []  Ice     [x]  heat  []  Ice massage Position:prone   Location:scapulothoracic     []  Vasopneumatic Device Pressure: [] lo [] med [] hi   Temp: [] lo [] med [] hi   [x] Skin assessment post-treatment:  [x]intact [x]redness- no adverse reaction       [x]redness - adverse reaction:       15 min Therapeutic Exercise:     Rationale: increase ROM, increase strength, improve coordination, improve balance, and increase proprioception to improve the patients ability to progress to functional activities limited by pain or other dysfunction     15 min Therapeutic Activity:     Rationale: to improve functional activities, model real life movements and performance specific to the patients need with supervision to return the patient to their PLOF      15 min Neuromuscular Re-education:     Rationale: exercises designed to improve and/or maintain balance, coordination, kinesthetic sense, posture, and/or proprioception for functional activities to improve the patients ability to function in daily life    7 min Manual Therapy:  NC per insurance restrictions grade 4 prone thoracic P>A mobilization, STM/tissue mobs to R medial scapulothoracic musculature and K-taping for R lower trap facilitation    Rationale: decrease pain, increase ROM, increase tissue extensibility, decrease trigger points and increase postural awareness to improve functional mobility   The manual therapy interventions were performed at a separate and distinct time from the therapeutic activities interventions          Billed With/As:   [] TE   [] TA   [] Neuro   [] Self Care Patient Education: [x] Review HEP    [] Progressed/Changed HEP based on:   [] positioning   [] body mechanics   [] transfers   [] heat/ice application    [] other:      Other Objective/Functional Measures:  Verbal cueing for proper form/technique with various exercises during treatment today  Cueing to avoid over retraction with theraband rows today   Post Treatment Pain Level (on 0 to 10) scale:   0  / 10     ASSESSMENT  Assessment/Changes in Function:   Pt reported increased prolonged benefit from manual intervention as well as kineseotaping last treatment. Pt also presents with decreased palpable tension in R medial scapular boarder, just isolated to inferior portion now. Will continue to progress/advance patient within current POC as tolerated with monitoring symptoms. []  See Progress Note/Recertification   Patient will continue to benefit from skilled PT services to modify and progress therapeutic interventions, address functional mobility deficits, address ROM deficits, address strength deficits, analyze and address soft tissue restrictions, analyze and cue movement patterns, analyze and modify body mechanics/ergonomics, assess and modify postural abnormalities and instruct in home and community integration to attain remaining goals. Progress toward goals / Updated goals:  1. Improve FOTO score to >/= 62/100 to indicate improved function   2. Pt will report =/> 75% overall improvement from therapy since initial evaluation to indicate increased functional improvement from current POC.   3. Pt will report =/< 5/10 max pain level for increased function with ADL's.-5/19: goal not met; max pain 10/10  4. Pt will report =/> 75% reduction in scapulothoracic tension with normal level ADL's with manual intervention concentrated to region.   5. Pt will demonstrate =/> 25% thoracic rotation mobility bilaterally for increased functional mobility with ADL's. 5/9/22: added t/s rot/ext with ELY to thread the needle to address     PLAN  [x]  Upgrade activities as tolerated yes Continue plan of care   []  Discharge due to :    []  Other:      Therapist: Ingrid Pozo PTA    Date: 5/23/2022 Time: 4:50 PM     Future Appointments   Date Time Provider Sarah Cabello   6/1/2022  4:30 PM Rachelle Chamorro PTA [facility]

## 2022-06-01 ENCOUNTER — HOSPITAL ENCOUNTER (OUTPATIENT)
Dept: PHYSICAL THERAPY | Age: 22
Discharge: HOME | End: 2022-06-01
Payer: COMMERCIAL

## 2022-06-01 PROCEDURE — 97112 NEUROMUSCULAR REEDUCATION: CPT

## 2022-06-01 PROCEDURE — 97110 THERAPEUTIC EXERCISES: CPT

## 2022-06-01 PROCEDURE — 97530 THERAPEUTIC ACTIVITIES: CPT

## 2022-06-01 NOTE — PROGRESS NOTES
[address] [place] [address] PHYSICAL THERAPY  [facility], [place], [address] - Phone: [phone]  Fax: [phone]  PROGRESS NOTE  Patient Name: Miguel Bucio : 2000   Treatment/Medical Diagnosis: Pain in thoracic spine [M54.6]   Referral Source: Aaron Vizcarra*     Date of Initial Visit: 22 Attended Visits: 9 Missed Visits: 1     SUMMARY OF TREATMENT  Therapeutic exercise for cervical/thoracic mobility/flexibility, postural awareness/strengthening, cervicothoracic/core stabilization, manual intervention, electrical stimulation with moist heat and HEP. CURRENT STATUS  Pt reports 70% overall improvement in sx since beginning care. Pt reports 7/10 max pain, 4/10 avg pain. Pain made worse with no particular increase/change in activity. Pt is making steady progress with gaining scapulothoracic mobility as well as advancing with strengthening and cervicothoracic stabilization within current POC. .    Improvements:  Pt reports the most functional improvement with decreased frequency and intensity of pain/symptoms with normal level ADL's. Remaining functional limitations: Increased limitations/pain/symptoms intermittently with no particular increase/change in activity. Objective Measurements:  Cervical AROM= Flexion= 30 deg; Extension= 55 deg; Side bending= R= 31 deg, L= 35 deg; Rotation= 74 deg bilaterally   Thoracic AROM= (measured in standing) Rotation= R= 60%, L= 75%  Parascapular strength= Mid Traps= 3+/5 bilaterally; Lower Traps= 3-/5 bilaterally w/ UT comp    FOTO 66/100    Goal/Measure of Progress Goal Met? 1. Improve FOTO score to >/= 62/100 to indicate improved function    Status at last Eval: 49/100 Current Status: 66/100 yes   2. Pt will report =/> 75% overall improvement from therapy since initial evaluation to indicate increased functional improvement from current POC.    Status at last Eval: 25% overall improvement reported  Current Status: 70% overall improvement reported Progress, nearly met    3. Pt will report =/< 5/10 max pain level for increased function with ADL's   Status at last Eval: 9/10 max pain level  Current Status: 7/10 max pain level progress     Goal/Measure of Progress Goal Met? 4. Pt will report =/> 75% reduction in scapulothoracic tension with normal level ADL's with manual intervention concentrated to region. Status at last Eval: New goal established last assessment  Current Status: 80% reduction reported yes   5. Pt will demonstrate =/> 25% thoracic rotation mobility bilaterally for increased functional mobility with ADL's. Status at last Eval: Rotation= R= 50%, L= 40% Current Status:  R= 60%, L= 75% Partially Met, L rotation goal met, still progressing with R rotation      New Goals to be achieved in __3__  treatments:  1. Pt will report =/> 75% overall improvement from therapy since initial evaluation to indicate increased functional improvement from current POC. 2. Pt will report =/< 5/10 max pain level for increased function with ADL's  3. Pt will demonstrate =/> 15% with R thoracic rotation mobility for increased thoracic symmetry as well as functional mobility with ADL's.  4. Pt will demonstrate 1/3 muscle grade improvement = 3/5 with lower trap strength bilaterally for improved scapular strength and symmetry with overhead UE reaching type ADL's. RECOMMENDATIONS  Continue with current POC for 3 remaining visits scheduled with advancing as tolerated, then reassess for discharge from therapy as planned/discussed. If you have any questions/comments please contact us directly at [phone]. Thank you for allowing us to assist in the care of your patient.     Therapist Signature: Gilmar Green PTA Date: 6/1/2022    Rosy Rutledge PT, DPT, CMTPT Time: 5:02 PM   NOTE TO PHYSICIAN:  PLEASE COMPLETE THE ORDERS BELOW AND FAX TO   [facility]: [phone]  If you are unable to process this request in 24 hours please contact our office: [phone]    ___ I have read the above report and request that my patient continue as recommended.   ___ I have read the above report and request that my patient continue therapy with the following changes/special instructions:_________________________________________________________   ___ I have read the above report and request that my patient be discharged from therapy.      Physician Signature:        Date:       Time:       Ange Deng

## 2022-06-01 NOTE — PROGRESS NOTES
PHYSICAL THERAPY - DAILY TREATMENT NOTE    Patient Name: Bert York        Date: 2022  : 2000   yes Patient  Verified  Visit #:   9   of   10-12  Ins[facility]: [facility] [address] / Plan: [address] / Product Type: Managed Care Medicaid /      In time: 4:32 Out time: 5:42   Total Treatment Time: 70     Medicare/BCBS Time Tracking (below)   Total Timed Codes (min):  53 1:1 Treatment Time:       TREATMENT AREA =  Pain in thoracic spine [M54.6]    SUBJECTIVE  Pain Level (on 0 to 10 scale):  3  / 10   Medication Changes/New allergies or changes in medical history, any new surgeries or procedures?    no  If yes, update Summary List   Subjective Functional Status/Changes:  []  No changes reported     I still feel the pain in both sides of my back, but it hasn't been as bad lately. I think that the tape really helps a lot too.           OBJECTIVE  Modality rationale: decrease pain and increase tissue extensibility to improve the patients ability to improve functional abilities    Min Type Additional Details    [] Estim: []Att   []Unatt  []TENS instruct                 []IFC  []Premod []NMES                       []Other:  []w/US   []w/ice   []w/heat  Position:  Location:    []  Traction: [] Cervical       []Lumbar                       [] Prone          []Supine                       []Intermittent   []Continuous Lbs:  [] before manual  [] after manual    []  Ultrasound: []Continuous   [] Pulsed                           []1MHz   []3MHz Location:  W/cm2:    []  Iontophoresis with dexamethasone         Location: [] Take home patch   [] In clinic   10 []  Ice     [x]  heat  []  Ice massage Position:prone  Location:scapulothoracic     []  Vasopneumatic Device Pressure: [] lo [] med [] hi   Temp: [] lo [] med [] hi   [x] Skin assessment post-treatment:  [x]intact [x]redness- no adverse reaction       []redness - adverse reaction:       18 min Therapeutic Exercise: Rationale: increase ROM, increase strength, improve coordination, improve balance, and increase proprioception to improve the patients ability to progress to functional activities limited by pain or other dysfunction     17 min Therapeutic Activity:     Rationale: to improve functional activities, model real life movements and performance specific to the patients need with supervision to return the patient to their PLOF      18 min Neuromuscular Re-education:     Rationale: exercises designed to improve and/or maintain balance, coordination, kinesthetic sense, posture, and/or proprioception for functional activities to improve the patients ability to function in daily life    7 min Manual Therapy:  NC per insurance restrictions K-tape for bilateral lower trap and scapular depression facilitation at 50% tension Y strip    Rationale: decrease pain, increase ROM, increase tissue extensibility, decrease trigger points and increase postural awareness to improve functional mobility   The manual therapy interventions were performed at a separate and distinct time from the therapeutic activities interventions          Billed With/As:   [] TE   [] TA   [] Neuro   [] Self Care Patient Education: [x] Review HEP    [] Progressed/Changed HEP based on:   [] positioning   [] body mechanics   [] transfers   [] heat/ice application    [] other:      Other Objective/Functional Measures:       Post Treatment Pain Level (on 0 to 10) scale:   0  / 10     ASSESSMENT  Assessment/Changes in Function:   See Progress note/Physician update for full detailed progress towards established goals.      [x]  See Progress Note/Recertification   Patient will continue to benefit from skilled PT services to modify and progress therapeutic interventions, address functional mobility deficits, address ROM deficits, address strength deficits, analyze and address soft tissue restrictions, analyze and cue movement patterns, analyze and modify body mechanics/ergonomics, assess and modify postural abnormalities and instruct in home and community integration to attain remaining goals. Progress toward goals / Updated goals:  See Progress note/Physician update for full detailed progress towards established goals. PLAN  [x]  Upgrade activities as tolerated yes Continue plan of care   []  Discharge due to :    []  Other:      Therapist: Radha Ferguson PTA    Date: 6/1/2022 Time: 4:45 PM     No future appointments.

## 2022-06-06 ENCOUNTER — HOSPITAL ENCOUNTER (OUTPATIENT)
Dept: PHYSICAL THERAPY | Age: 22
Discharge: HOME | End: 2022-06-06
Payer: COMMERCIAL

## 2022-06-06 PROCEDURE — 97530 THERAPEUTIC ACTIVITIES: CPT

## 2022-06-06 PROCEDURE — 97112 NEUROMUSCULAR REEDUCATION: CPT

## 2022-06-06 PROCEDURE — 97110 THERAPEUTIC EXERCISES: CPT

## 2022-06-06 NOTE — PROGRESS NOTES
PHYSICAL THERAPY - DAILY TREATMENT NOTE    Patient Name: Елена Almazan        Date: 2022  : 2000   yes Patient  Verified  Visit #:   10   of   12  Ins[facility]: [facility] [address] / Plan: [address] / Product Type: Managed Care Medicaid /      In time: 5:32 Out time: 9:39   Total Treatment Time: 67     Medicare/BCBS Time Tracking (below)   Total Timed Codes (min):  50 1:1 Treatment Time:       TREATMENT AREA =  Pain in thoracic spine [M54.6]    SUBJECTIVE  Pain Level (on 0 to 10 scale):  2  / 10   Medication Changes/New allergies or changes in medical history, any new surgeries or procedures?    no  If yes, update Summary List   Subjective Functional Status/Changes:  []  No changes reported     My back has been feeling better since I was here last, but I think that I liked the taping that you did to my one shoulder blade better than the one that you did to both sides the last time I was here.            OBJECTIVE  Modality rationale: decrease pain and increase tissue extensibility to improve the patients ability to improve functional abilities    Min Type Additional Details    [] Estim: []Att   []Unatt  []TENS instruct                 []IFC  []Premod []NMES                       []Other:  []w/US   []w/ice   []w/heat  Position:  Location:    []  Traction: [] Cervical       []Lumbar                       [] Prone          []Supine                       []Intermittent   []Continuous Lbs:  [] before manual  [] after manual    []  Ultrasound: []Continuous   [] Pulsed                           []1MHz   []3MHz Location:  W/cm2:    []  Iontophoresis with dexamethasone         Location: [] Take home patch   [] In clinic   10 []  Ice     [x]  heat  []  Ice massage Position:prone  Location:scapulothoracic     []  Vasopneumatic Device Pressure: [] lo [] med [] hi   Temp: [] lo [] med [] hi   [x] Skin assessment post-treatment:  [x]intact [x]redness- no adverse reaction []redness - adverse reaction:       17 min Therapeutic Exercise:     Rationale: increase ROM, increase strength, improve coordination, improve balance, and increase proprioception to improve the patients ability to progress to functional activities limited by pain or other dysfunction     16 min Therapeutic Activity:     Rationale: to improve functional activities, model real life movements and performance specific to the patients need with supervision to return the patient to their PLOF      17 min Neuromuscular Re-education:     Rationale: exercises designed to improve and/or maintain balance, coordination, kinesthetic sense, posture, and/or proprioception for functional activities to improve the patients ability to function in daily life    7 min Manual Therapy:  NC per insurance restrictions grade 4 prone thoracic P>A mobilization, STM/tissue mobs to R>L medial scapulothoracic musculature and K-taping for R lower trap facilitation    Rationale: decrease pain, increase ROM, increase tissue extensibility, decrease trigger points and increase postural awareness to improve functional mobility   The manual therapy interventions were performed at a separate and distinct time from the therapeutic activities interventions          Billed With/As:   [] TE   [] TA   [] Neuro   [] Self Care Patient Education: [x] Review HEP    [] Progressed/Changed HEP based on:   [] positioning   [] body mechanics   [] transfers   [] heat/ice application    [] other:      Other Objective/Functional Measures:  Verbal cueing for proper form/technique with various exercises during treatment today  Addition of Kieser X-pulldowns and prone scapular T&Y PRE's    Post Treatment Pain Level (on 0 to 10) scale:   0  / 10     ASSESSMENT  Assessment/Changes in Function:   Pt presents with the most functional improvement with decreasing pain levels over the last 3 visits progressively.  However, she reported the most benefit from unilateral kineseotaping for R lower trap recruitment versus bilateral taping upon trial last treatment. Pt was also moderately challenged with scapular strength/endurance R>L with addition of Kieser X-pulldowns and prone scapular T&Y PRE's today. Will continue to progress/advance patient within current POC as tolerated with monitoring symptoms. []  See Progress Note/Recertification   Patient will continue to benefit from skilled PT services to modify and progress therapeutic interventions, address functional mobility deficits, address ROM deficits, address strength deficits, analyze and address soft tissue restrictions, analyze and cue movement patterns, analyze and modify body mechanics/ergonomics, assess and modify postural abnormalities and instruct in home and community integration to attain remaining goals. Progress toward goals / Updated goals:  New Goals to be achieved in __3__  treatments:  1. Pt will report =/> 75% overall improvement from therapy since initial evaluation to indicate increased functional improvement from current POC. 2. Pt will report =/< 5/10 max pain level for increased function with ADL's  3. Pt will demonstrate =/> 15% with R thoracic rotation mobility for increased thoracic symmetry as well as functional mobility with ADL's.  4. Pt will demonstrate 1/3 muscle grade improvement = 3/5 with lower trap strength bilaterally for improved scapular strength and symmetry with overhead UE reaching type ADL's.      PLAN  [x]  Upgrade activities as tolerated yes Continue plan of care   []  Discharge due to :    []  Other:      Therapist: Radha Ferguson PTA    Date: 6/6/2022 Time: 5:36 PM     Future Appointments   Date Time Provider Sarah Cabello   6/13/2022  5:30 PM Neva Smyth [facility]   6/22/2022  5:15 PM Karin Bangura PTA [facility]

## 2022-06-13 ENCOUNTER — APPOINTMENT (OUTPATIENT)
Dept: PHYSICAL THERAPY | Age: 22
End: 2022-06-13
Payer: COMMERCIAL

## 2022-06-22 ENCOUNTER — HOSPITAL ENCOUNTER (OUTPATIENT)
Dept: PHYSICAL THERAPY | Age: 22
Discharge: HOME | End: 2022-06-22
Payer: COMMERCIAL

## 2022-06-22 PROCEDURE — 97530 THERAPEUTIC ACTIVITIES: CPT

## 2022-06-22 PROCEDURE — 97110 THERAPEUTIC EXERCISES: CPT

## 2022-06-22 PROCEDURE — 97112 NEUROMUSCULAR REEDUCATION: CPT

## 2022-07-06 ENCOUNTER — HOSPITAL ENCOUNTER (OUTPATIENT)
Dept: PHYSICAL THERAPY | Age: 22
Discharge: HOME | End: 2022-07-06
Payer: COMMERCIAL

## 2022-07-06 PROCEDURE — 97530 THERAPEUTIC ACTIVITIES: CPT

## 2022-07-06 PROCEDURE — 97112 NEUROMUSCULAR REEDUCATION: CPT

## 2022-07-06 PROCEDURE — 97110 THERAPEUTIC EXERCISES: CPT

## 2022-07-06 NOTE — PROGRESS NOTES
PHYSICAL THERAPY - DAILY TREATMENT NOTE    Patient Name: Lilia Rae        Date: 2022  : 2000   yes Patient  Verified  Visit #:     Insurance: Payor: [address] / Plan: [address] / Product Type: Managed Care Medicaid /      In time: 4:30 Out time: 5:37   Total Treatment Time: 67     Medicare/BCBS Time Tracking (below)   Total Timed Codes (min):  57 1:1 Treatment Time:       TREATMENT AREA =  Pain in thoracic spine [M54.6]    SUBJECTIVE  Pain Level (on 0 to 10 scale):  0  / 10   Medication Changes/New allergies or changes in medical history, any new surgeries or procedures?    no  If yes, update Summary List   Subjective Functional Status/Changes:  []  No changes reported     My back has been feeling a lot better, but it still hurts between my shoulder blades more so on the right than the left for no specific reason that I can think of.            OBJECTIVE  Modality rationale: decrease pain and increase tissue extensibility to improve the patients ability to improve functional abilities    Min Type Additional Details    [] Estim: []Att   []Unatt  []TENS instruct                 []IFC  []Premod []NMES                       []Other:  []w/US   []w/ice   []w/heat  Position:  Location:    []  Traction: [] Cervical       []Lumbar                       [] Prone          []Supine                       []Intermittent   []Continuous Lbs:  [] before manual  [] after manual    []  Ultrasound: []Continuous   [] Pulsed                           []1MHz   []3MHz Location:  W/cm2:    []  Iontophoresis with dexamethasone         Location: [] Take home patch   [] In clinic   10 []  Ice     [x]  heat  []  Ice massage Position:prone  Location:scapulothoracic     []  Vasopneumatic Device Pressure: [] lo [] med [] hi   Temp: [] lo [] med [] hi   [] Skin assessment post-treatment:  []intact []redness- no adverse reaction       []redness - adverse reaction:       19 min Therapeutic Exercise:     Rationale: increase ROM, increase strength, improve coordination, improve balance, and increase proprioception to improve the patients ability to progress to functional activities limited by pain or other dysfunction     19 min Therapeutic Activity:     Rationale: to improve functional activities, model real life movements and performance specific to the patients need with supervision to return the patient to their PLOF      19 min Neuromuscular Re-education:     Rationale: exercises designed to improve and/or maintain balance, coordination, kinesthetic sense, posture, and/or proprioception for functional activities to improve the patients ability to function in daily life          Billed With/As:   [] TE   [] TA   [] Neuro   [] Self Care Patient Education: [x] Review HEP    [] Progressed/Changed HEP based on:   [] positioning   [] body mechanics   [] transfers   [] heat/ice application    [] other:      Other Objective/Functional Measures:       Post Treatment Pain Level (on 0 to 10) scale:   0  / 10     ASSESSMENT  Assessment/Changes in Function:   See Progress note/Physician update for full detailed progress towards established goals. [x]  See Progress Note/Recertification   Patient will continue to benefit from skilled PT services to modify and progress therapeutic interventions, address functional mobility deficits, address ROM deficits, address strength deficits, analyze and address soft tissue restrictions, analyze and cue movement patterns, analyze and modify body mechanics/ergonomics, assess and modify postural abnormalities and instruct in home and community integration to attain remaining goals. Progress toward goals / Updated goals:  See Progress note/Physician update for full detailed progress towards established goals.      PLAN  [x]  Upgrade activities as tolerated yes Continue plan of care   []  Discharge due to :    []  Other:      Therapist: Sukhjinder Ling PTA Date: 7/6/2022 Time: 4:43 PM     Future Appointments   Date Time Provider Sarah Cabello   7/11/2022  5:30 PM Bill Steele [facility]   7/18/2022  4:15 PM Maylin Hurley PT [facility]   7/25/2022  4:15 PM [facility] PT [place] 2 [facility]

## 2022-07-06 NOTE — PROGRESS NOTES
[address] [place] [address] PHYSICAL THERAPY  [facility], [place], [address] - Phone: [phone]  Fax: [phone]  PROGRESS NOTE  Patient Name: Keyana Lawrence : 2000   Treatment/Medical Diagnosis: Pain in thoracic spine [M54.6]   Referral Source: Vanessa Torres*     Date of Initial Visit: 22 Attended Visits: 12 Missed Visits: 1     SUMMARY OF TREATMENT  Therapeutic exercise for cervical/thoracic mobility/flexibility, postural awareness/strengthening, cervicothoracic/core stabilization, manual intervention, electrical stimulation with moist heat and HEP. CURRENT STATUS  Pt reports 80% overall improvement in sx since beginning care. Pt reports 8/10 max pain, 6/10 avg pain. Pain made worse with prolonged sitting or standing > 1 hour as well as with increased repetitive activities with R UE. Pt is making steady progress with gaining scapulothoracic mobility as well as advancing with strengthening and cervicothoracic stabilization within current POC. .    Improvements: Pt reports the most functional improvement with decreased intensity and frequency of intrascapular/thoracic tension and muscle spasms since initial evaluation. Remaining functional limitations: Increased limitations/pain/symptoms with prolonged sitting or standing > 1 hour as well as with increased repetitive activities with R UE. Objective Measurements:  Cervical AROM= Flexion= 38 deg; Extension= 68 deg; Side bending= R= 36 deg, L= 42 deg; Rotation= R= 75 deg, L= 79 deg   Thoracic AROM= (measured in standing) Rotation= full/WFL's bilaterally   Parascapular strength= Mid Traps= 3+/5 bilaterally; Lower Traps= 3/5 bilaterally     FOTO 62/100    Goal/Measure of Progress Goal Met? 1. Pt will report =/> 75% overall improvement from therapy since initial evaluation to indicate increased functional improvement from current POC.    Status at last Eval: 70% overall improvement reported Current Status: 80% overall improvement reported yes   2. Pt will report =/< 5/10 max pain level for increased function with ADL's   Status at last Eval: 7/10 max pain level Current Status: 8/10 max pain level no   3. Pt will demonstrate =/> 15% with R thoracic rotation mobility for increased thoracic symmetry as well as functional mobility with ADL's. Status at last Eval:  Rotation= R= 60% Current Status: Rotation= full/WFL's bilaterally yes     Goal/Measure of Progress Goal Met? 4. Pt will demonstrate 1/3 muscle grade improvement = 3/5 with lower trap strength bilaterally for improved scapular strength and symmetry with overhead UE reaching type ADL's. Status at last Eval: Lower Traps= 3-/5 bilaterally w/ UT comp Current Status: Lower Traps= 3/5 bilaterally  yes     New Goals to be achieved in __4__  treatments: 1. Pt will report =/< 5/10 max pain level for increased function with ADL's   2. Pt will report 75% reduction in pain/symptoms with prolonged sitting or standing > 1 hour as well as with increased repetitive activities with R UE.   3. Pt will demonstrate =/> 1/3 muscle grade improvement with mid and lower trap strength bilaterally for improved scapular strength and symmetry with overhead UE reaching type ADL's. RECOMMENDATIONS  Continue with current POC for 4 additional visits with advancing as tolerated, then reassess for discharge from therapy as planned/discussed. If you have any questions/comments please contact us directly at [phone]. Thank you for allowing us to assist in the care of your patient.     Therapist Signature: Alvaro Nguyen PTA Date: 7/6/2022    Juan Diego Nunn PT, DPT, CMTPT Time: 4:52 PM   NOTE TO PHYSICIAN:  PLEASE COMPLETE THE ORDERS BELOW AND FAX TO   [facility]: [phone]  If you are unable to process this request in 24 hours please contact our office: [phone]    ___ I have read the above report and request that my patient continue as recommended.   ___ I have read the above report and request that my patient continue therapy with the following changes/special instructions:_________________________________________________________   ___ I have read the above report and request that my patient be discharged from therapy.      Physician Signature:        Date:       Time:       Leena Hirsch

## 2022-07-13 ENCOUNTER — HOSPITAL ENCOUNTER (OUTPATIENT)
Dept: PHYSICAL THERAPY | Age: 22
End: 2022-07-13
Payer: COMMERCIAL

## 2022-07-18 ENCOUNTER — HOSPITAL ENCOUNTER (OUTPATIENT)
Dept: PHYSICAL THERAPY | Age: 22
Discharge: HOME | End: 2022-07-18
Payer: COMMERCIAL

## 2022-07-18 PROCEDURE — 97112 NEUROMUSCULAR REEDUCATION: CPT

## 2022-07-18 PROCEDURE — 97110 THERAPEUTIC EXERCISES: CPT

## 2022-07-18 PROCEDURE — 97530 THERAPEUTIC ACTIVITIES: CPT

## 2022-07-18 NOTE — PROGRESS NOTES
PHYSICAL THERAPY - DAILY TREATMENT NOTE    Patient Name: Yusuf Woodall        Date: 2022  : 2000   yes Patient  Verified  Visit #:   15   of   16  [facility] [address] / Plan: [address] / Product Type: Managed Care Medicaid /      In time: 5:13 Out time: 6:04   Total Treatment Time: 51     Medicare/BCBS Time Tracking (below)   Total Timed Codes (min):  na 1:1 Treatment Time:  na     TREATMENT AREA =  Pain in thoracic spine [M54.6]    SUBJECTIVE  Pain Level (on 0 to 10 scale):  0  / 10   Medication Changes/New allergies or changes in medical history, any new surgeries or procedures?    no  If yes, update Summary List   Subjective Functional Status/Changes:  []  No changes reported     \"I still get the same pain when I stand or sit too long but the pain is nothing like what it was when I first started and it doesn't last as long\"          OBJECTIVE  Modality rationale: decrease pain and increase tissue extensibility to improve the patients ability to complete ADLs   Min Type Additional Details    [] Estim: []Att   []Unatt  []TENS instruct                 []IFC  []Premod []NMES                       []Other:  []w/US   []w/ice   []w/heat  Position:  Location:    []  Traction: [] Cervical       []Lumbar                       [] Prone          []Supine                       []Intermittent   []Continuous Lbs:  [] before manual  [] after manual    []  Ultrasound: []Continuous   [] Pulsed                           []1MHz   []3MHz Location:  W/cm2:    []  Iontophoresis with dexamethasone         Location: [] Take home patch   [] In clinic   10 []  Ice     [x]  heat  []  Ice massage Position: prone  Location: to t/s    []  Vasopneumatic Device Pressure: [] lo [] med [] hi   Temp: [] lo [] med [] hi   [x] Skin assessment post-treatment:  [x]intact []redness- no adverse reaction       []redness - adverse reaction:      10 min Therapeutic Exercise:     Rationale: increase ROM, increase strength, improve coordination, improve balance, and increase proprioception to improve the patients ability to progress to functional activities limited by pain or other dysfunction     16 min Therapeutic Activity:     Rationale: to improve functional activities, model real life movements and performance specific to the patients need with supervision to return the patient to their PLOF      15 min Neuromuscular Re-education:     Rationale: exercises designed to improve and/or maintain balance, coordination, kinesthetic sense, posture, and/or proprioception for functional activities to improve the patients ability to function in daily life          Billed With/As:   [x] TE   [] TA   [] Neuro   [] Self Care Patient Education: [x] Review HEP    [] Progressed/Changed HEP based on:   [] positioning   [] body mechanics   [] transfers   [] heat/ice application    [] other:      Other Objective/Functional Measures:    -progressed to TB dynamic hugs to improve SA strength  -added SB TA contraction against wall w/ CT stabs  -progressed seated t/s ext to 1/2 kneel t/s ext/rot; compensates w/ lumbar hyperextension  -progressed cat/camel to birddog UE/LE slide w/ lift; cues to avoid lumbar extension and rotation     Post Treatment Pain Level (on 0 to 10) scale:   0  / 10     ASSESSMENT  Assessment/Changes in Function:     Focus of tx today on self management with HEP; withheld manual to assess response. Pt's HEP updated and pt provided with printout and band. Pt return demo understanding of updated exercises.       []  See Progress Note/Recertification   Patient will continue to benefit from skilled PT services to modify and progress therapeutic interventions, address functional mobility deficits, address ROM deficits, address strength deficits, analyze and address soft tissue restrictions, analyze and cue movement patterns, analyze and modify body mechanics/ergonomics, assess and modify postural abnormalities and instruct in home and community integration to attain remaining goals. Progress toward goals / Updated goals: 1. Pt will report =/< 5/10 max pain level for increased function with ADL's   2.  Pt will report 75% reduction in pain/symptoms with prolonged sitting or standing > 1 hour as well as with increased repetitive activities with R UE.   3. Pt will demonstrate =/> 1/3 muscle grade improvement with mid and lower trap strength bilaterally for improved scapular strength and symmetry with overhead UE reaching type ADL's. 7/18/22: added multiple exercises today to address this goal today (see flow sheet)       PLAN  []  Upgrade activities as tolerated yes Continue plan of care   []  Discharge due to :    []  Other:      Therapist: Oscar Hanks PT    Date: 7/18/2022 Time: 9:47 AM     Future Appointments   Date Time Provider Sarah Cabello   7/18/2022  4:15 PM Smitha Malave, PT [facility]   7/25/2022  4:15 PM [facility] [place] [facility]   8/1/2022  4:15 PM Smitha Malave PT [facility]

## 2022-07-25 ENCOUNTER — APPOINTMENT (OUTPATIENT)
Dept: PHYSICAL THERAPY | Age: 22
End: 2022-07-25
Payer: COMMERCIAL

## 2022-08-01 ENCOUNTER — APPOINTMENT (OUTPATIENT)
Dept: PHYSICAL THERAPY | Age: 22
End: 2022-08-01

## 2022-08-02 NOTE — PROGRESS NOTES
[address] [facility] AT [address], [address] [place] [address] Road  Phone: [phone]   [phone]    DISCHARGE NOTE  Patient Name: Shameka Burden : 2000   Treatment/Medical Diagnosis: Pain in thoracic spine [M54.6]   Referral Source: Chantell Gastelum*     Date of Initial Visit: 2022 Attended Visits: 13 Missed Visits: 3       SUMMARY OF TREATMENT  Pt attended initial evaluation and 13 follow-up appointments. Unfortunately, patient no-showed for 3 appointments, and has been discharged from our care per clinic attendance/compliance policy. Please see PN dated 2022 for final obtained objective information. RECOMMENDATIONS  Discontinue physical therapy due to patient not returning. If you have any questions/comments please contact us directly at [phone]. Thank you for allowing us to assist in the care of your patient. Therapist Signature: Shannon De Anda, PT Date: 2022     Time: 2:26 PM     NOTE TO PHYSICIAN:  Your patient's insurance requires this discharge note be signed and returned. PLEASE COMPLETE THE ORDERS BELOW AND RETURN TO:  [facility] @ [phone]    ___ I have read the above report and request that my patient be discharged from therapy.      Physician Signature:        Date:       Time:                       Chantell Gastelum

## 2023-02-21 NOTE — PROGRESS NOTES
PHYSICAL THERAPY - DAILY TREATMENT NOTE    Patient Name: Selma Ruffin        Date: 2022  : 2000   yes Patient  Verified  Visit #:     Ins[facility]: [facility] [address] / Plan: [address] / Product Type: Managed Care Medicaid /      In time: 5:15 Out time: 6:14   Total Treatment Time: 59     Medicare/BCBS Time Tracking (below)   Total Timed Codes (min):  42 1:1 Treatment Time:       TREATMENT AREA =  Pain in thoracic spine [M54.6]    SUBJECTIVE  Pain Level (on 0 to 10 scale):  0  / 10   Medication Changes/New allergies or changes in medical history, any new surgeries or procedures?    no  If yes, update Summary List   Subjective Functional Status/Changes:  []  No changes reported     My back has been feeling pretty good overall since I was here last but it has been hurting more on the right side a little bit more on and off with doing a lot of writing with studying more lately.            OBJECTIVE  Modality rationale: decrease pain and increase tissue extensibility to improve the patients ability to improve functional abilities    Min Type Additional Details    [] Estim: []Att   []Unatt  []TENS instruct                 []IFC  []Premod []NMES                       []Other:  []w/US   []w/ice   []w/heat  Position:  Location:    []  Traction: [] Cervical       []Lumbar                       [] Prone          []Supine                       []Intermittent   []Continuous Lbs:  [] before manual  [] after manual    []  Ultrasound: []Continuous   [] Pulsed                           []1MHz   []3MHz Location:  W/cm2:    []  Iontophoresis with dexamethasone         Location: [] Take home patch   [] In clinic   10 []  Ice     [x]  heat  []  Ice massage Position:prone   Location:scapulothoracic     []  Vasopneumatic Device Pressure: [] lo [] med [] hi   Temp: [] lo [] med [] hi   [x] Skin assessment post-treatment:  [x]intact [x]redness- no adverse reaction []redness - adverse reaction:       17 min Therapeutic Exercise:     Rationale: increase ROM, increase strength, improve coordination, improve balance, and increase proprioception to improve the patients ability to progress to functional activities limited by pain or other dysfunction     17 min Therapeutic Activity:     Rationale: to improve functional activities, model real life movements and performance specific to the patients need with supervision to return the patient to their PLOF      8 min Neuromuscular Re-education:     Rationale: exercises designed to improve and/or maintain balance, coordination, kinesthetic sense, posture, and/or proprioception for functional activities to improve the patients ability to function in daily life    7 min Manual Therapy:  NC per insurance restrictions  grade 4 prone thoracic P>A mobilization, STM/tissue mobs to R medial scapulothoracic musculature and Instrument assisted soft tissue mobilization applied to R medial scapulothoracic region using GT-1   Rationale: decrease pain, increase ROM, increase tissue extensibility, decrease trigger points and increase postural awareness to improve functional mobility  The manual therapy interventions were performed at a separate and distinct time from the therapeutic activities interventions          Billed With/As:   [] TE   [] TA   [] Neuro   [] Self Care Patient Education: [x] Review HEP    [] Progressed/Changed HEP based on:   [] positioning   [] body mechanics   [] transfers   [] heat/ice application    [] other:      Other Objective/Functional Measures:  Verbal cueing for proper form/technique with various exercises during treatment today  addition of prone T&Y scapular strengthening exercises   Post Treatment Pain Level (on 0 to 10) scale:   0  / 10     ASSESSMENT  Assessment/Changes in Function:   Pt was able to resume full normal therex regiment with min to mod challenge today after approximately 2 weeks since last treatment due to being on hold while waiting for insurance authorization. Pt presented chief c/o intermittent R scapulothoracic pain/symptoms from prolonged writing activity since last treatment. Pt was also able to advance to addition of prone T&Y scapular strengthening exercises with good form/control and min to mod challenge as well today. Will review detailed progress/LTGs for planned discharge next visit. []  See Progress Note/Recertification   Patient will continue to benefit from skilled PT services to modify and progress therapeutic interventions, address functional mobility deficits, address ROM deficits, address strength deficits, analyze and address soft tissue restrictions, analyze and cue movement patterns, analyze and modify body mechanics/ergonomics, assess and modify postural abnormalities and instruct in home and community integration to attain remaining goals. Progress toward goals / Updated goals:  New Goals to be achieved in __3__  treatments:  1. Pt will report =/> 75% overall improvement from therapy since initial evaluation to indicate increased functional improvement from current POC. 2. Pt will report =/< 5/10 max pain level for increased function with ADL's 6/22/22: Met, Pt reports 5/10 max pain level since last treatment  3. Pt will demonstrate =/> 15% with R thoracic rotation mobility for increased thoracic symmetry as well as functional mobility with ADL's.  4. Pt will demonstrate 1/3 muscle grade improvement = 3/5 with lower trap strength bilaterally for improved scapular strength and symmetry with overhead UE reaching type ADL's. PLAN  [x]  Upgrade activities as tolerated yes Continue plan of care   []  Discharge due to :    []  Other:      Therapist: Missael Manzanares PTA    Date: 6/22/2022 Time: 5:19 PM     No future appointments. .